# Patient Record
Sex: MALE | Race: WHITE | NOT HISPANIC OR LATINO | URBAN - METROPOLITAN AREA
[De-identification: names, ages, dates, MRNs, and addresses within clinical notes are randomized per-mention and may not be internally consistent; named-entity substitution may affect disease eponyms.]

---

## 2017-04-04 ENCOUNTER — OUTPATIENT (OUTPATIENT)
Dept: OUTPATIENT SERVICES | Facility: HOSPITAL | Age: 77
LOS: 1 days | End: 2017-04-04
Payer: MEDICARE

## 2017-04-04 ENCOUNTER — APPOINTMENT (OUTPATIENT)
Dept: RADIOLOGY | Facility: CLINIC | Age: 77
End: 2017-04-04

## 2017-04-04 DIAGNOSIS — Z00.8 ENCOUNTER FOR OTHER GENERAL EXAMINATION: ICD-10-CM

## 2017-04-04 DIAGNOSIS — Z96.642 PRESENCE OF LEFT ARTIFICIAL HIP JOINT: Chronic | ICD-10-CM

## 2017-04-04 PROCEDURE — 73610 X-RAY EXAM OF ANKLE: CPT

## 2020-05-08 ENCOUNTER — APPOINTMENT (OUTPATIENT)
Dept: NEUROLOGY | Facility: CLINIC | Age: 80
End: 2020-05-08
Payer: MEDICARE

## 2020-05-08 PROCEDURE — 99205 OFFICE O/P NEW HI 60 MIN: CPT | Mod: 95

## 2020-05-08 NOTE — DISCUSSION/SUMMARY
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 I advised that consideration be given to changing his levodopa regimen to more frequent dosing to maintain a more constant, steady state dopaminergic stimulation in order to mitigate motor fluctuations.I also reviewed the impact constipation has on levodopa’s absorption and the importance exercise has on gait. \par \par I therefore recommended taking Sinemet 1.5 tabs q4hrs (total 6 tabs/d) and to stop the evening amantadine—which can exacerbate the paranoia. \par F/U with GI is important\par Maintain social engagement and daily physical activity\par Refer for home PT\par \par

## 2020-05-08 NOTE — HISTORY OF PRESENT ILLNESS
[Home] : at home, [unfilled] , at the time of the visit. [Other Location: e.g. Home (Enter Location, City,State)___] : at [unfilled] [Family Member] : family member [Formal Caregiver] : formal caregiver [Patient] : the patient [FreeTextEntry2] : and daughter [FreeTextEntry1] : He has a 10year hx of PD and was referred by Dr Simpson for consideration for DBS. Patients daughter reports that over the past 1.5 years patient’s gait has declined and uses a walker to ambulate. He states that he feels stiff throughout the day and his caretaker descirbes wearing off effects of his levodopa. He currently takes about 6 tabs of carbidopa/levodopa  divided into 3 three doses (6 hrs apart). He experiences drowsiness after taking his medication, especially the 2nd dose. During his on period, his tremor dissipates and he is more agile. However, he requires assistance with all ADLs at this time. He has short term memory deficits as well as occasional visual hallucinations. In the evenings, paranoia regarding people coming for him emerge but he is easily redirectable. He is not engaged in PT at this time. \par \par Nonmotor symptom\par Anosmia\par No dysphagia\par + constipation is severe. uses enema every 2-3 days.\par Sleep well during the night\par \par Medications:\par Sinemet 25/100 2-1.5-2\par Aricept 10mg qd\par Namenda 10mg qd \par Amantadine 100mg every other qhs\par

## 2020-11-04 ENCOUNTER — NON-APPOINTMENT (OUTPATIENT)
Age: 80
End: 2020-11-04

## 2020-11-04 DIAGNOSIS — K59.00 CONSTIPATION, UNSPECIFIED: ICD-10-CM

## 2020-11-04 DIAGNOSIS — R15.9 FULL INCONTINENCE OF FECES: ICD-10-CM

## 2020-11-04 DIAGNOSIS — Z82.61 FAMILY HISTORY OF ARTHRITIS: ICD-10-CM

## 2020-11-04 DIAGNOSIS — R46.81 OBSESSIVE-COMPULSIVE BEHAVIOR: ICD-10-CM

## 2020-11-04 DIAGNOSIS — R44.3 HALLUCINATIONS, UNSPECIFIED: ICD-10-CM

## 2020-11-04 DIAGNOSIS — Z97.8 PRESENCE OF OTHER SPECIFIED DEVICES: ICD-10-CM

## 2020-11-04 RX ORDER — AMANTADINE HYDROCHLORIDE 100 1/1
100 TABLET ORAL
Refills: 0 | Status: ACTIVE | COMMUNITY

## 2020-11-09 ENCOUNTER — NON-APPOINTMENT (OUTPATIENT)
Age: 80
End: 2020-11-09

## 2020-11-09 DIAGNOSIS — E66.3 OVERWEIGHT: ICD-10-CM

## 2020-11-09 DIAGNOSIS — L40.9 PSORIASIS, UNSPECIFIED: ICD-10-CM

## 2020-11-09 DIAGNOSIS — R41.3 OTHER AMNESIA: ICD-10-CM

## 2020-11-09 DIAGNOSIS — E05.00 THYROTOXICOSIS WITH DIFFUSE GOITER W/OUT THYROTOXIC CRISIS OR STORM: ICD-10-CM

## 2020-11-09 DIAGNOSIS — I10 ESSENTIAL (PRIMARY) HYPERTENSION: ICD-10-CM

## 2020-11-09 RX ORDER — METHENAMINE MANDELATE 1000 MG/1
TABLET, FILM COATED ORAL
Refills: 0 | Status: ACTIVE | COMMUNITY

## 2020-11-09 RX ORDER — DONEPEZIL HYDROCHLORIDE 10 MG/1
10 TABLET ORAL
Refills: 0 | Status: ACTIVE | COMMUNITY

## 2020-12-16 ENCOUNTER — NON-APPOINTMENT (OUTPATIENT)
Age: 80
End: 2020-12-16

## 2021-01-20 ENCOUNTER — APPOINTMENT (OUTPATIENT)
Dept: NEUROLOGY | Facility: CLINIC | Age: 81
End: 2021-01-20

## 2021-02-08 ENCOUNTER — APPOINTMENT (OUTPATIENT)
Dept: NEUROLOGY | Facility: CLINIC | Age: 81
End: 2021-02-08
Payer: MEDICARE

## 2021-02-08 DIAGNOSIS — F02.80 PARKINSON'S DISEASE WITHOUT DYSKINESIA, WITHOUT MENTION OF FLUCTUATIONS: ICD-10-CM

## 2021-02-08 DIAGNOSIS — G20.A1 PARKINSON'S DISEASE WITHOUT DYSKINESIA, WITHOUT MENTION OF FLUCTUATIONS: ICD-10-CM

## 2021-02-08 DIAGNOSIS — L89.619 PRESSURE ULCER OF RIGHT HEEL, UNSPECIFIED STAGE: ICD-10-CM

## 2021-02-08 PROCEDURE — 99213 OFFICE O/P EST LOW 20 MIN: CPT | Mod: 95

## 2021-02-08 RX ORDER — CEPHALEXIN 500 MG/1
500 CAPSULE ORAL
Qty: 28 | Refills: 0 | Status: ACTIVE | COMMUNITY
Start: 2021-02-02

## 2021-02-08 RX ORDER — CARBIDOPA AND LEVODOPA 25; 100 MG/1; MG/1
25-100 TABLET, EXTENDED RELEASE ORAL 3 TIMES DAILY
Refills: 0 | Status: DISCONTINUED | COMMUNITY
End: 2021-02-08

## 2021-02-08 NOTE — HISTORY OF PRESENT ILLNESS
[FreeTextEntry1] :  Fredo Mayte was last evaluated on October 14, 2020 via telemedicine. 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 He resides in his own home with 24/7 aides.  This visit was assisted by Tessa.  He has a chronic indwelling Bernabe.  There is a nurse who comes to the home every month to change the Bernabe.  He is under the care of a podiatrist Dr. Fernando.  He has a right heel decubitus which is being treated with cephalexin.  The podiatrist suggested an air mattress.  His bed is [facility] by the [facility].  He has not yet been vaccinated against COVID-19.  Mrs. Hu contracted COVID-19 in December but Dr. Hu was not affected.\par \par According to Tessa, his legs have become increasingly stiff and weak to the point that he cannot stand.  He is spoon fed and is taking nutrition and liquids well.  He occasionally hallucinates.\par \par He is currently on Sinemet 25/100 2 tablets 3 times a day, amantadine 100 mg daily and donepezil 10 mg daily.

## 2021-02-08 NOTE — REVIEW OF SYSTEMS
[Feeling Tired] : feeling tired [Confused or Disoriented] : confusion [Memory Lapses or Loss] : memory loss [Decr. Concentrating Ability] : decreased concentrating ability [Inability to Walk] : inability to walk [Incontinence] : incontinence [Negative] : Heme/Lymph

## 2021-02-08 NOTE — REASON FOR VISIT
[Home] : at home, [unfilled] , at the time of the visit. [Medical Office: ([facility])___] : at the medical office located in  [Formal Caregiver] : formal caregiver [Other:____] : [unfilled] [Verbal consent obtained from patient] : the patient, [unfilled] negative - no cough

## 2021-02-08 NOTE — ASSESSMENT
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  I suggested that Tessa contact the [facility] regarding an air mattress.  I will mail a prescription to the home address.  I also suggested [facility] regarding [facility] of the [facility]-19 vaccine for Dr. Hu.  I will continue donepezil, Sinemet and amantadine at their current doses.  A follow-up visit will be arranged for 2 months from today.  Further management will depend upon his clinical course.

## 2021-02-08 NOTE — PHYSICAL EXAM
[FreeTextEntry1] : He was lying in bed.  He kept his eyes closed.  He was oriented to person, home, [place], February 2012.  He knew the name of the current president.  He followed simple commands.  There was marked facial masking and bradykinesia.

## 2023-10-01 PROBLEM — G20.A1 PARKINSON'S DISEASE DEMENTIA: Status: ACTIVE | Noted: 2020-11-04

## 2025-03-30 NOTE — PHYSICAL EXAM
Bulb Madhav-Pharynx Suction with additional procedures [FreeTextEntry1] : 3+ maskiong and hypophonia. Moderate bradyphrenia. There is mild rest tremor in his right hand. There is right greater than left moderate bradykinesia. Overflow dyskinesia in his head is present. require assistance to stand and uses a walker. Strides are short with en bloc turns. No FOG